# Patient Record
Sex: FEMALE | Race: WHITE | NOT HISPANIC OR LATINO | URBAN - NONMETROPOLITAN AREA
[De-identification: names, ages, dates, MRNs, and addresses within clinical notes are randomized per-mention and may not be internally consistent; named-entity substitution may affect disease eponyms.]

---

## 2020-08-27 ENCOUNTER — IMPORTED ENCOUNTER (OUTPATIENT)
Dept: URBAN - NONMETROPOLITAN AREA CLINIC 1 | Facility: CLINIC | Age: 60
End: 2020-08-27

## 2020-08-27 PROBLEM — H52.4: Noted: 2020-08-27

## 2020-08-27 PROBLEM — H52.03: Noted: 2020-08-27

## 2020-08-27 PROBLEM — H52.221: Noted: 2020-08-27

## 2020-08-27 PROBLEM — H25.13: Noted: 2020-08-27

## 2020-08-27 PROCEDURE — 92004 COMPRE OPH EXAM NEW PT 1/>: CPT

## 2020-08-27 PROCEDURE — 92015 DETERMINE REFRACTIVE STATE: CPT

## 2020-08-27 NOTE — PATIENT DISCUSSION
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

## 2022-04-09 ASSESSMENT — VISUAL ACUITY
OD_CC: 20/20 BLURRY
OS_GLARE: 20/60
OS_CC: 20/20 BLURRY
OD_GLARE: 20/60

## 2022-04-09 ASSESSMENT — TONOMETRY
OS_IOP_MMHG: 18
OD_IOP_MMHG: 18